# Patient Record
Sex: MALE | Race: WHITE | URBAN - METROPOLITAN AREA
[De-identification: names, ages, dates, MRNs, and addresses within clinical notes are randomized per-mention and may not be internally consistent; named-entity substitution may affect disease eponyms.]

---

## 2019-12-18 ENCOUNTER — HISTORICAL (OUTPATIENT)
Dept: CARDIOLOGY | Facility: HOSPITAL | Age: 84
End: 2019-12-18

## 2019-12-18 LAB
ABS NEUT (OLG): 14.65 X10(3)/MCL (ref 2.1–9.2)
ALBUMIN SERPL-MCNC: 3 GM/DL (ref 3.4–5)
ALBUMIN/GLOB SERPL: 0.8 RATIO (ref 1.1–2)
ALP SERPL-CCNC: 108 UNIT/L (ref 50–136)
ALT SERPL-CCNC: 14 UNIT/L (ref 12–78)
AST SERPL-CCNC: 13 UNIT/L (ref 15–37)
BASOPHILS # BLD AUTO: 0.1 X10(3)/MCL (ref 0–0.2)
BASOPHILS NFR BLD AUTO: 0 %
BILIRUB SERPL-MCNC: 0.7 MG/DL (ref 0.2–1)
BILIRUBIN DIRECT+TOT PNL SERPL-MCNC: 0.2 MG/DL (ref 0–0.5)
BILIRUBIN DIRECT+TOT PNL SERPL-MCNC: 0.5 MG/DL (ref 0–0.8)
BUN SERPL-MCNC: 51 MG/DL (ref 7–18)
CALCIUM SERPL-MCNC: 8.9 MG/DL (ref 8.5–10.1)
CHLORIDE SERPL-SCNC: 96 MMOL/L (ref 98–107)
CO2 SERPL-SCNC: 28 MMOL/L (ref 21–32)
CREAT SERPL-MCNC: 6.8 MG/DL (ref 0.7–1.3)
EOSINOPHIL # BLD AUTO: 0.1 X10(3)/MCL (ref 0–0.9)
EOSINOPHIL NFR BLD AUTO: 1 %
ERYTHROCYTE [DISTWIDTH] IN BLOOD BY AUTOMATED COUNT: 15.1 % (ref 11.5–17)
GLOBULIN SER-MCNC: 3.7 GM/DL (ref 2.4–3.5)
GLUCOSE SERPL-MCNC: 170 MG/DL (ref 74–106)
HCT VFR BLD AUTO: 31.8 % (ref 42–52)
HGB BLD-MCNC: 10 GM/DL (ref 14–18)
LYMPHOCYTES # BLD AUTO: 1.4 X10(3)/MCL (ref 0.6–4.6)
LYMPHOCYTES NFR BLD AUTO: 8 %
MCH RBC QN AUTO: 29 PG (ref 27–31)
MCHC RBC AUTO-ENTMCNC: 31.4 GM/DL (ref 33–36)
MCV RBC AUTO: 92.2 FL (ref 80–94)
MONOCYTES # BLD AUTO: 1.2 X10(3)/MCL (ref 0.1–1.3)
MONOCYTES NFR BLD AUTO: 7 %
NEUTROPHILS # BLD AUTO: 14.65 X10(3)/MCL (ref 2.1–9.2)
NEUTROPHILS NFR BLD AUTO: 83 %
PLATELET # BLD AUTO: 80 X10(3)/MCL (ref 130–400)
PMV BLD AUTO: 9.7 FL (ref 9.4–12.4)
POTASSIUM SERPL-SCNC: 3.7 MMOL/L (ref 3.5–5.1)
PROT SERPL-MCNC: 6.7 GM/DL (ref 6.4–8.2)
RBC # BLD AUTO: 3.45 X10(6)/MCL (ref 4.7–6.1)
SODIUM SERPL-SCNC: 135 MMOL/L (ref 136–145)
WBC # SPEC AUTO: 17.7 X10(3)/MCL (ref 4.5–11.5)

## 2022-04-30 NOTE — OP NOTE
DATE OF SURGERY:    12/18/2019    SURGEON:  Cameron Peraza MD    PREOPERATIVE DIAGNOSIS:  End-stage renal disease.    POSTOPERATIVE DIAGNOSIS:  End-stage renal disease.    PROCEDURE PERFORMED:    1. Ultrasound-guided access of left upper extremity AV graft.  2. Fistulogram.  3. Balloon angioplasty of venous outflow stenosis.  4. Approximately 30 minutes of monitored moderate conscious sedation.    ANESTHESIA:  Local with 1% lidocaine.    ESTIMATED BLOOD LOSS:  Less than 10 cc.    COMPLICATIONS:  None.    INDICATION FOR PROCEDURE:  The patient has known end-stage renal disease and has a left forearm access placed by an outside physician with excessive bleeding and low flows reported.  Operation indicated for further investigation.    PROCEDURE IN DETAIL:  After informed consent was obtained, the patient taken to the operating room and placed in supine position.  He was prepped and draped in sterile fashion.  We used ultrasound to identify the access in the direction of the venous outflow, and this was done with an 18-gauge needle followed by a wire via Seldinger technique, and ultimately placed a 4-Bengali sheath.  We performed central venous imaging which revealed a widely patent central venous system.  We performed imaging of his upper arm which revealed wide patency distally.  However, proximally he had stents placed, and there was an in-stent stenosis.  The access in the forearm was widely patent.  We were able to cross the distal lesion easily with a wire and prove true lumen with contrast injection.  We performed balloon angioplasty through this stent and conclusion imaging revealed wide patency with brisk flow of contrast through this area.  There was no residual flow limiting stenosis.  If this were to recur, the patient would potentially require realigning of his existing stents.  At this point, the sheaths were removed and pursestring suture was     placed.  The patient tolerated the procedure well  without any acute events or complications, was transferred in stable condition to recovery room.        ______________________________  MD LARY Perkins III/JULIET  DD:  12/26/2019  Time:  09:24AM  DT:  12/26/2019  Time:  09:49AM  Job #:  [id number]